# Patient Record
Sex: FEMALE | Race: WHITE | Employment: FULL TIME | URBAN - METROPOLITAN AREA
[De-identification: names, ages, dates, MRNs, and addresses within clinical notes are randomized per-mention and may not be internally consistent; named-entity substitution may affect disease eponyms.]

---

## 2017-10-13 ENCOUNTER — OFFICE VISIT (OUTPATIENT)
Dept: ORTHOPEDIC SURGERY | Age: 22
End: 2017-10-13

## 2017-10-13 VITALS — HEIGHT: 63 IN | WEIGHT: 220 LBS | BODY MASS INDEX: 38.98 KG/M2

## 2017-10-13 DIAGNOSIS — S63.681A OTHER SPRAIN OF RIGHT THUMB, INITIAL ENCOUNTER: ICD-10-CM

## 2017-10-13 DIAGNOSIS — M79.644 FINGER PAIN, RIGHT: Primary | ICD-10-CM

## 2017-10-13 PROCEDURE — L3809 WHFO W/O JOINTS PRE OTS: HCPCS | Performed by: PHYSICIAN ASSISTANT

## 2017-10-13 PROCEDURE — 73140 X-RAY EXAM OF FINGER(S): CPT | Performed by: PHYSICIAN ASSISTANT

## 2017-10-13 PROCEDURE — 99214 OFFICE O/P EST MOD 30 MIN: CPT | Performed by: PHYSICIAN ASSISTANT

## 2017-10-13 RX ORDER — ESCITALOPRAM OXALATE 10 MG/1
TABLET ORAL
Refills: 3 | COMMUNITY
Start: 2017-10-02

## 2017-10-13 RX ORDER — SODIUM FLUORIDE 6 MG/ML
PASTE, DENTIFRICE DENTAL
Refills: 3 | COMMUNITY
Start: 2017-10-05

## 2017-10-13 NOTE — PROGRESS NOTES
require stabilization / immobilization from this semi-rigid / rigid orthosis to improve their function. The orthosis will assist in protecting the affected area, provide functional support and facilitate healing. The patient was educated and fit by a healthcare professional with expert knowledge and specialization in brace application while under the direct supervision of the treating physician. Verbal and written instructions for the use of and application of this item were provided. They were instructed to contact the office immediately should the brace result in increased pain, decreased sensation, increased swelling or worsening of the condition. Treatment Plan:  Patient is placed into a thumb spica removable brace. She is encouraged to ice and elevate. Over-the-counter anti-inflammatory medications. I have arranged for her to have follow-up with hand surgeon in 2 weeks if not doing better.

## 2018-01-22 ENCOUNTER — OFFICE VISIT (OUTPATIENT)
Dept: ORTHOPEDIC SURGERY | Age: 23
End: 2018-01-22

## 2018-01-22 VITALS
HEART RATE: 96 BPM | BODY MASS INDEX: 45.08 KG/M2 | WEIGHT: 245 LBS | HEIGHT: 62 IN | SYSTOLIC BLOOD PRESSURE: 119 MMHG | DIASTOLIC BLOOD PRESSURE: 76 MMHG

## 2018-01-22 DIAGNOSIS — M79.644 FINGER PAIN, RIGHT: Primary | ICD-10-CM

## 2018-01-22 PROCEDURE — 99213 OFFICE O/P EST LOW 20 MIN: CPT | Performed by: ORTHOPAEDIC SURGERY

## 2018-01-22 PROCEDURE — 73140 X-RAY EXAM OF FINGER(S): CPT | Performed by: ORTHOPAEDIC SURGERY

## 2018-01-23 NOTE — PROGRESS NOTES
Chief Complaint  Injury (Right small finger)      History of Present Illness:  Carrie Prieto is a 23 y.o. female , right-hand-dominant, diane at [employer], presenting with history of right small finger injury  Date of injury: Approximately mid November 2017  Mechanism of injury: The patient states that she was on a geology field trip for class when she smashed a geode with a hammer and the geode shattered into multiple fragments. One of the fragments penetrated her right small finger near the dorsal PIP joint and she had a small wound in this area. The wound subsequently healed but she noticed persistent swelling and area of prominence near the dorsal PIP joint of right small finger. She states that it does fluctuate in size and is irritating to her when performing gripping motions. She denies any weakness, no numbness or tingling in her finger and no additional pain throughout the right small finger or hand. No fever or chills or other constitutional symptoms    Medical History  Patient's medications, allergies, past medical, surgical, social and family histories were reviewed and updated as appropriate. Review of Systems  Pertinent items are noted in HPI  Review of systems reviewed from Patient History Form dated on 1/22/18 and available in the patient's chart under the Media tab. Vital Signs  Vitals:    01/22/18 0908   BP: 119/76   Pulse: 96       General Exam:   Constitutional: Patient is adequately groomed with no evidence of malnutrition  Mental Status: The patient is oriented to time, place and person. The patient's mood and affect are appropriate. Lymphatic: The lymphatic examination bilaterally reveals all areas to be without enlargement or induration. Neurological: The patient has good coordination. There is no weakness or sensory deficit.     Right small Finger/hand Examination  Inspection:  Raised area near the central aspect of dorsal PIP joint right small

## 2018-01-26 ENCOUNTER — TELEPHONE (OUTPATIENT)
Dept: ORTHOPEDIC SURGERY | Age: 23
End: 2018-01-26

## 2018-01-31 NOTE — PROGRESS NOTES
[facility] / [facility] ([facility]) Teagan Caban, [address]    Acknowledgment of Informed Consent for Surgical or Medical Procedure and Sedation  I agree to allow doctor(s) [facility] and his/her associates or assistants, including residents and/or other qualified medical practitioner to perform the following medical treatment or procedure and to administer or direct the administration of sedation as necessary:  Procedure(s): EXPLORATION OF RIGHT SMALL FINGER WOUND WITH REMOVAL OF FOREIGN BODY RIGHT SMALL FINGER, TENDON REPAIR AS INDICATED PROCEDURES  My doctor has explained the following regarding the proposed procedure:   the explanation of the procedure   the benefits of the procedure   the potential problems that might occur during recuperation   the risks and side effects of the procedure which could include but are not limited to severe blood loss, infection, stroke or death   the benefits, risks and side effect of alternative procedures including the consequences of declining this procedure or any alternative procedures   the likelihood of achieving satisfactory results. I acknowledge no guarantee or assurance has been made to me regarding the results. I understand that during the course of this treatment/procedure, unforeseen conditions can occur which require an additional or different procedure. I agree to allow my physician or assistants to perform such extension of the original procedure as they may find necessary. I understand that sedation will often result in temporary impairment of memory and fine motor skills and that sedation can occasionally progress to a state of deep sedation or general anesthesia. I understand the risks of anesthesia for surgery include, but are not limited to, sore throat, hoarseness, injury to face, mouth, or teeth; nausea; headache; injury to blood vessels or nerves; death, brain damage, or paralysis.     I understand that if I have a

## 2018-02-01 RX ORDER — ALBUTEROL SULFATE 90 UG/1
2 AEROSOL, METERED RESPIRATORY (INHALATION) EVERY 6 HOURS PRN
COMMUNITY

## 2018-02-01 NOTE — PROGRESS NOTES
The following educational items and goals will be achieved upon completion of the patient's Pre-admission testing experience:             Identify the learner who is being assessed for education:  family                    Ability to Learn:  Exhibits ability to grasp concepts and respond to questions: High  Ready to Learn: Yes  calm   Preferred Method of Learning:  verbal  Barriers to Learning: Verbalizes interest  Special Considerations due to cultural, Islam, spiritual beliefs:  No  Language:  English  :  Svetlana Archuleta  [x] Appropriate evaluation / integration of data as delineated by ASPAN Standards of Perianesthesia Nursing Practice    Pain scale and pain management   [x]Patient verbalizes understanding of pain scale and pain management  [x]Pre-operative determination of patients anticipated Post-Operative pain goal:   4 of 10 on 10 point scale post op goal  [] Other     Medication(s) - Compliance with preop medication instructions  [x] Patient verbalizes understanding of preop medications (see [facility] Presurgical Instructions)    Instructions, Pre op                                                                                            [x] Patient verbalizes understanding of presurgical instructions as reviewed with phone interview nurse or in-person nurse review    Fall Risk Potential, Preoperatively                                                                                   [x]No preoperative risk identified  []Preop risk identified:                    []Sensory deficit        []Motor deficit        []Balance problem        []Home medication        []Uses assistive device                    []History of a Fall within the last 30 days    Goal(s) for fall prevention:  [x]Prevent fall or injury by requesting assistance with activities of daily living  [x]Patient / Significant other verbalizes understanding the need to call for

## 2018-02-01 NOTE — PRE-PROCEDURE INSTRUCTIONS
family touch your surgery site without cleaning their hands. 4. Mild nausea, headache, muscle aches, sore throat, or fatigue may occur after anesthesia. Should any of these symptoms become severe, or should you notice any signs of infection, you should call your surgeon. 5. Narcotic pain medications can cause significant constipation. You may want to add a stool softener to your postoperative medication schedule or speak to your surgeon on how best to manage this SIDE EFFECT. SPECIAL INSTRUCTIONS     Thank you for allowing us to care for you. We strive to exceed your expectations in the delivery of care and service provided to you and your family. If you need to contact us for any reason, please call us at [phone]    Instructions reviewed with patient 'S MOM- HERNÁN during preadmission testing phone interview. AND LMOR FOR PATIENT CELL PHONE   Surjit Hatch. 2/1/2018 .12:58 PM      ADDITIONAL EDUCATIONAL INFORMATION REVIEWED PER PHONE WITH YOU AND/OR YOUR FAMILY:  Yes Bring a urine sample on day of surgery  Yes Pain Goal-Taking Control of Your Pain  Yes FAQs about Surgical Site Infections  Yes Hibiclens® Bathing Instructions / or Other Antibacterial Soap  No Incentive Spirometer Education

## 2018-02-02 ENCOUNTER — HOSPITAL ENCOUNTER (OUTPATIENT)
Dept: PREADMISSION TESTING | Age: 23
Discharge: HOME | End: 2018-02-02
Attending: ORTHOPAEDIC SURGERY | Admitting: ORTHOPAEDIC SURGERY

## 2018-02-02 VITALS
SYSTOLIC BLOOD PRESSURE: 128 MMHG | TEMPERATURE: 97.6 F | RESPIRATION RATE: 18 BRPM | HEART RATE: 90 BPM | HEIGHT: 63 IN | OXYGEN SATURATION: 100 % | DIASTOLIC BLOOD PRESSURE: 82 MMHG | WEIGHT: 246 LBS | BODY MASS INDEX: 43.59 KG/M2

## 2018-02-02 DIAGNOSIS — S60.456A FOREIGN BODY OF RIGHT LITTLE FINGER: Primary | ICD-10-CM

## 2018-02-02 LAB — PREGNANCY, URINE: NEGATIVE

## 2018-02-02 RX ORDER — FENTANYL CITRATE 50 UG/ML
25 INJECTION, SOLUTION INTRAMUSCULAR; INTRAVENOUS EVERY 5 MIN PRN
Status: DISCONTINUED | OUTPATIENT
Start: 2018-02-02 | End: 2018-02-03 | Stop reason: HOSPADM

## 2018-02-02 RX ORDER — SODIUM CHLORIDE, SODIUM LACTATE, POTASSIUM CHLORIDE, CALCIUM CHLORIDE 600; 310; 30; 20 MG/100ML; MG/100ML; MG/100ML; MG/100ML
INJECTION, SOLUTION INTRAVENOUS CONTINUOUS
Status: DISCONTINUED | OUTPATIENT
Start: 2018-02-02 | End: 2018-02-03 | Stop reason: HOSPADM

## 2018-02-02 RX ORDER — HYDROCODONE BITARTRATE AND ACETAMINOPHEN 5; 325 MG/1; MG/1
1 TABLET ORAL EVERY 6 HOURS PRN
Qty: 6 TABLET | Refills: 0 | Status: SHIPPED | OUTPATIENT
Start: 2018-02-02 | End: 2018-02-05

## 2018-02-02 RX ORDER — LABETALOL HYDROCHLORIDE 5 MG/ML
5 INJECTION, SOLUTION INTRAVENOUS EVERY 10 MIN PRN
Status: DISCONTINUED | OUTPATIENT
Start: 2018-02-02 | End: 2018-02-03 | Stop reason: HOSPADM

## 2018-02-02 RX ORDER — ONDANSETRON 2 MG/ML
4 INJECTION INTRAMUSCULAR; INTRAVENOUS
Status: ACTIVE | OUTPATIENT
Start: 2018-02-02 | End: 2018-02-02

## 2018-02-02 RX ORDER — ACETAMINOPHEN 500 MG
1000 TABLET ORAL ONCE
Status: COMPLETED | OUTPATIENT
Start: 2018-02-02 | End: 2018-02-02

## 2018-02-02 RX ORDER — MEPERIDINE HYDROCHLORIDE 25 MG/ML
12.5 INJECTION INTRAMUSCULAR; INTRAVENOUS; SUBCUTANEOUS EVERY 5 MIN PRN
Status: DISCONTINUED | OUTPATIENT
Start: 2018-02-02 | End: 2018-02-03 | Stop reason: HOSPADM

## 2018-02-02 RX ADMIN — Medication 1000 MG: at 09:58

## 2018-02-02 RX ADMIN — SODIUM CHLORIDE, SODIUM LACTATE, POTASSIUM CHLORIDE, CALCIUM CHLORIDE: 600; 310; 30; 20 INJECTION, SOLUTION INTRAVENOUS at 06:00

## 2018-02-02 ASSESSMENT — PAIN SCALES - GENERAL: PAINLEVEL_OUTOF10: 0

## 2018-02-02 ASSESSMENT — PAIN - FUNCTIONAL ASSESSMENT: PAIN_FUNCTIONAL_ASSESSMENT: 0-10

## 2018-02-02 NOTE — ANESTHESIA POST-OP
Anesthesia Post-op Note    Patient: Zi Gillis  MRN: [id number]  YOB: 1995  Date of evaluation: 2/2/2018  Time:  10:24 AM     Procedure(s) Performed:     Last Vitals: /82   Pulse 90   Temp 97.6 °F (36.4 °C) (Oral)   Resp 18   Ht 5' 2.5\" (1.588 m)   Wt 246 lb (111.6 kg)   SpO2 100%   BMI 44.28 kg/m²     John Phase I: John Score: 10    John Phase II: John Score: 10    Anesthesia Post Evaluation    Final anesthesia type: MAC  Patient location during evaluation: PACU  Patient participation: complete - patient participated  Level of consciousness: awake and alert  Pain score: 0  Airway patency: patent  Nausea & Vomiting: no nausea and no vomiting  Complications: no  Cardiovascular status: blood pressure returned to baseline  Respiratory status: acceptable  Hydration status: euvolemic        Bren Man MD  10:24 AM

## 2018-02-02 NOTE — BRIEF OP NOTE
Brief Postoperative Note    Laura Llanos  YOB: 1995  [id number]    Pre-operative Diagnosis: 1. Symptomatic foreign body, right small finger    Post-operative Diagnosis: Same    Procedure: 1.  Removal of foreign body, right small finger subcutaneous, complicated    Anesthesia: MAC and Local    Surgeons/Assistants: Chelsea Blevins MD/Kelly DUBON     Estimated Blood Loss: less than 5ml    Complications: None immediate apparent    Specimens: Was Obtained: foreign body right small finger with surrounding local tissue    Findings: see fully dictated operative report    Electronically signed by Jasmine Flannery MD on 2/2/2018 at 8:36 AM

## 2018-02-02 NOTE — PROGRESS NOTES
Right thumb dressing clean dry and intact with no drainage or bleeding or swelling. Pt given sling to keep right hand elevated, com[plained of headache so Tylenol PO given before discharge.

## 2018-02-02 NOTE — ANESTHESIA PRE-OP
Medication Dose Route Frequency Provider Last Rate Last Dose    lactated ringers infusion   Intravenous Continuous Carmel Hernandez  mL/hr at 02/02/18 0600      ceFAZolin (ANCEF) 2 g in dextrose 3 % 50 mL IVPB (duplex)  2 g Intravenous Once Cody Pat MD           Allergies: Allergies   Allergen Reactions    Sulfa Antibiotics Rash       Problem List:  There is no problem list on file for this patient. Past Medical History:        Diagnosis Date    Anxiety and depression     Asthma     Obese     Optic nerve swelling     Sensitivity to medication        Past Surgical History:        Procedure Laterality Date    WISDOM TOOTH EXTRACTION         Social History:    Social History   Substance Use Topics    Smoking status: Never Smoker    Smokeless tobacco: Never Used    Alcohol use No                                Counseling given: Not Answered      Vital Signs (Current):   Vitals:    02/01/18 1236 02/02/18 0551 02/02/18 0553   BP:   127/75   Pulse:   115   Resp:   16   Temp:   98 °F (36.7 °C)   TempSrc:   Oral   SpO2:   99%   Weight: 246 lb (111.6 kg) 246 lb (111.6 kg)    Height: 5' 3\" (1.6 m) 5' 2.5\" (1.588 m)                                               BP Readings from Last 3 Encounters:   02/02/18 127/75   01/22/18 119/76   08/23/14 130/76       NPO Status: Time of last liquid consumption: 2330                        Time of last solid consumption: 2315                        Date of last liquid consumption: 02/01/18                        Date of last solid food consumption: 02/01/18    BMI:   Wt Readings from Last 3 Encounters:   02/02/18 246 lb (111.6 kg)   01/22/18 245 lb (111.1 kg)   10/13/17 220 lb (99.8 kg)     Body mass index is 44.28 kg/m².     Anesthesia Evaluation   no history of anesthetic complications:   Airway: Mallampati: II  TM distance: >3 FB   Neck ROM: full  Mouth opening: > = 3 FB Dental:      Comment: Good dentition  Upper and lower Braces    Pulmonary: (+) asthma:                           ROS comment: Denies COPD, Smoking  + Asthma (rare MDI use)   Cardiovascular:                   ROS comment: Denies CP, SOA with moderate exercise     Neuro/Psych:   (+) psychiatric history:            GI/Hepatic/Renal:        (-) GERD, liver disease and no renal disease       Endo/Other:        (-) hypothyroidism, no Type II DM               Abdominal:           Vascular:                                        Anesthesia Plan      general     ASA 2       Induction: intravenous. Anesthetic plan and risks discussed with patient.                       Maximo Soulier, MD   2/2/2018

## 2018-02-03 NOTE — OP NOTE
[facility] [address]                                 OPERATIVE REPORT    PATIENT NAME: Edmond Velazquez                       :        1995  MED REC NO:   [id number]                          ROOM:  ACCOUNT NO:   [de-identified]                          ADMIT DATE: 2018  PROVIDER:     Joni Leonard MD    DATE OF PROCEDURE:  2018    PREOPERATIVE DIAGNOSIS:  Right small finger symptomatic foreign body. POSTOPERATIVE DIAGNOSIS:  Right small finger symptomatic foreign body. OPERATIONS AND PROCEDURES PERFORMED:  Exploration of right small finger  wound with removal of foreign body, right small finger subcutaneous,  complicated. ANESTHESIA:  MAC plus local anesthesia. PRIMARY SURGEON:  Joni Leonard MD    ASSISTANT:  [facility] surgical assistant. BLOOD LOSS:  Approximately 5 mL. COMPLICATIONS:  None immediate apparent. SPECIMENS:  Foreign body with surrounding local tissue sent for culture as  well as pathology. BRIEF HISTORY:  The patient is a 53-year-old female with a history of wound  to the dorsal aspect of her right small finger, which she sustained several  months prior to presenting to the clinic after she broke a rock structure  with penetrating injury to her right small finger. She had subsequent  continued swelling intermittently with pain with range of motion of her  finger, presented to my clinic, where images obtained demonstrated what  appeared to be a retained foreign body in her right small finger near the  PIP joint dorsally. We discussed options for treatment and after thorough  discussion, the patient elected for surgical exploration of the wound with  removal of the symptomatic foreign body and possible tendon repair or  arthrotomy as indicated.   She understood the risks, benefits, alternative  of the procedure with risks included, but not limited to infection,  bleeding, damage There  was no evidence of obvious tendon injury or traumatic arthrotomy from the  foreign body. The scar and granulomatous tissue were carefully removed  from the surface of the tendon while preserving the tendon structure of the  extensor mechanism PIP joint. No further evidence of foreign body. The  wound was irrigated copiously using normal saline irrigation at this point. Again, images of the finger under fluoroscopy two views of the right small  finger demonstrated at this point, no evidence of further retained foreign  body or other structures with normal appearance of the right small finger. Therefore at this point, tourniquet was deflated and hemostasis was  achieved. We then performed closure of the wound with interrupted 5-0  nylon suture. The patient was then placed into a sterile dressing and  overwrap, was awoken and taken to PACU in stable condition. POSTOPERATIVE PLAN:  Gentle finger range of motion as allowed in the  bandage. No heavy gripping or lifting, elevation x3 days of the right hand  small finger. Follow up culture and pathology report.   Plan for followup  in approximately 7 to 10 days for wound check and possible suture removal.        Elmer Philippe MD    D: 02/02/2018 15:45:43       T: 02/02/2018 15:48:25     [email or web address]  Job#: [id number]     Doc#: [id number]    CC:

## 2018-02-08 LAB
ANAEROBIC CULTURE: NORMAL
GRAM STAIN RESULT: NORMAL
WOUND/ABSCESS: NORMAL

## 2018-02-12 ENCOUNTER — OFFICE VISIT (OUTPATIENT)
Dept: ORTHOPEDIC SURGERY | Age: 23
End: 2018-02-12

## 2018-02-12 VITALS — BODY MASS INDEX: 43.59 KG/M2 | HEIGHT: 63 IN | WEIGHT: 246.03 LBS

## 2018-02-12 DIAGNOSIS — S60.459A FOREIGN BODY IN SKIN OF FINGER, INITIAL ENCOUNTER: Primary | ICD-10-CM

## 2018-02-12 PROCEDURE — 99024 POSTOP FOLLOW-UP VISIT: CPT | Performed by: ORTHOPAEDIC SURGERY

## 2020-07-01 ENCOUNTER — NURSE ONLY (OUTPATIENT)
Dept: PRIMARY CARE CLINIC | Age: 25
End: 2020-07-01

## 2020-07-01 PROCEDURE — 99211 OFF/OP EST MAY X REQ PHY/QHP: CPT | Performed by: NURSE PRACTITIONER

## 2020-07-01 NOTE — PROGRESS NOTES
Ely Magdaleno received a viral test for COVID-19. They were educated on isolation and quarantine as appropriate. For any symptoms, they were directed to seek care from their PCP, given contact information to establish with a doctor, directed to an urgent care or the emergency room.

## 2020-07-05 LAB
SARS-COV-2: NOT DETECTED
SOURCE: NORMAL

## 2021-03-22 ENCOUNTER — OFFICE VISIT (OUTPATIENT)
Dept: ORTHOPEDIC SURGERY | Age: 26
End: 2021-03-22

## 2021-03-22 VITALS — BODY MASS INDEX: 46.07 KG/M2 | HEIGHT: 63 IN | WEIGHT: 260 LBS

## 2021-03-22 DIAGNOSIS — S86.912A KNEE STRAIN, LEFT, INITIAL ENCOUNTER: ICD-10-CM

## 2021-03-22 DIAGNOSIS — M25.562 LEFT KNEE PAIN, UNSPECIFIED CHRONICITY: Primary | ICD-10-CM

## 2021-03-22 PROCEDURE — 99202 OFFICE O/P NEW SF 15 MIN: CPT | Performed by: PHYSICIAN ASSISTANT

## 2021-03-22 NOTE — PROGRESS NOTES
Subjective:      Patient ID: Sam Carey is a 27 y.o. female. Chief Complaint   Patient presents with    Knee Pain     Left knee, fell directly on her patella        HPI:   She is here for an initial evaluation of a new problem. Left lateral knee pain. Injury 3/20/2021. She was at work, [employer]. She is a carry out order specialist.  She states she slipped on something and fell in the cross knee position injuring her left knee. Pain Scale 6/10 VAS. Location of pain primarily lateral knee. Pain is worse with weightbearing. Pain improves with elevation. Previous treatments have included ice. Review Of Systems:   A 14 point review of systems and history form completed by the patient has been reviewed. This scanned in the media tab of the patient's chart under today's date. As outlined in the HPI. Negative for fever or chills. Negative for poly-joint pain, swelling and stiffness. Negative for numbness or tingling. Past Medical History:   Diagnosis Date    Anxiety and depression     Asthma     Obese     Optic nerve swelling     Sensitivity to medication        History reviewed. No pertinent family history. Past Surgical History:   Procedure Laterality Date    FINGER SURGERY Right 02/02/2018    small finger-removal of foreign body    WISDOM TOOTH EXTRACTION         Social History     Occupational History    Not on file   Tobacco Use    Smoking status: Never Smoker    Smokeless tobacco: Never Used   Substance and Sexual Activity    Alcohol use: No    Drug use: No    Sexual activity: Never     Partners: Male       Current Outpatient Medications   Medication Sig Dispense Refill    NONFORMULARY BIRTH CONTROL PILL NIGHTLY      escitalopram (LEXAPRO) 10 MG tablet TK 1 T PO NIGHTLY  3    cetirizine (ZYRTEC) 5 MG tablet Take 5 mg by mouth daily.  ibuprofen (ADVIL;MOTRIN) 200 MG tablet Take 200 mg by mouth every 8 hours as needed.       albuterol sulfate  (90 Base) MCG/ACT inhaler Inhale 2 puffs into the lungs every 6 hours as needed for Wheezing      PREVIDENT 5000 BOOSTER PLUS 1.1 % PSTE   3    acetaminophen (TYLENOL) 325 MG tablet Take 650 mg by mouth every 6 hours as needed.  pseudoephedrine (SUDAFED) 120 MG CR tablet Take 120 mg by mouth as needed. No current facility-administered medications for this visit. Objective:     She is alert, oriented x 3, pleasant, well nourished, developed and in no   acute distress. Ht 5' 3\" (1.6 m)   Wt 260 lb (117.9 kg)   BMI 46.06 kg/m²      Examination of the left knee: Inspection of the skin ecchymosis over the anterior medial proximal calf and anterior knee. Inspection of the soft tissues mild swelling. The overall alignment of the knee is neutral.  Gait is antalgic. There is minimal intra-articular effusion. AROM:           PROM:  Extension-         0                   0  Flexion -           120                   125  There mild pain associated with ROM testing. Medial joint line mildly tender to palpation. Lateral joint line moderately tender to palpation. Pes anserine bursa is non-tender to palpation. Patellar tendon is non-tender to palpation. Quadriceps tendon is non-tender to palpation. Collateral ligaments is non- tender to palpation. Popliteal fossa none tender to palpation. Varus Stress testing negative for laxity. Valgus Stress testing negative for laxity. Lachman's test negative for  ACL laxity. Anterior Drawer test negative for ACL laxity. Posterior Drawer test negative for PCL laxity. Daniela's Test negative for meniscus tear. Patellar Compression testing positive for pain or crepitus. Extensor Mechanism is intact. Examination of the lower extremities are intact with sensation to light touch. Motor testing  5/5 in all major motor groups of the lower extremities. Negative Brooke's Sign. SLR negative.       Examination of the lower extremities shows intact perfusion to all extremities. No cyanosis. Digits are warm to touch, capillary refill is less than 2 seconds. There is no edema noted. Examination of the skin over both lower extremities reveals: The skin to be intact without lacerations or abrasions. No significant erythema. No significant rashes or skin lesions. X Rays: performed in the office today:   AP, PA Standing, Lateral and Sunrise views of left knee:  No acute fractures or dislocations noted. There is no patellar subluxation. Additional Tests reviewed: none  Additional Outside Records reviewed: none    Diagnosis:       ICD-10-CM    1. Left knee pain, unspecified chronicity  M25.562 XR KNEE LEFT (MIN 4 VIEWS)   2. Knee strain, left, initial encounter  P32.928E         Assessment and Plan:       Assessment:  Left knee strain. [facility] injury. At least 16 minutes was the total time spent on today's visit  including reviewing test results, obtaining or reviewing history, physical exam, time spent on documentation or ordering prescriptions, tests and procedures after the visit. Plan:  Medications-   OTC NSAIDS discussed. She  was advised that NSAID-type medications have two very important potential side effects: gastrointestinal irritation including hemorrhage and renal injuries. She was asked to take the medication with food and to stop if she experiences any GI upset. I asked her to call for vomiting, abdominal pain or black/bloody stools. She should have renal function testing per his medical provider periodically. The patient expresses understanding of these issues and questions were answered. PT- A home exercise program was instructed today including ROM exercises and strengthening exercises. The patient verbalized understanding of these exercises as well as the importance of the exercise program to promote return of normal function.  If pain intensifies or other problems arise you are to notify the office. Further Imaging- none. Procedures- none. Off work x1 week. Note provided today. Follow up- 1 week with Dr Philly Howell. Call or return to clinic if these symptoms worsen or fail to improve as anticipated.

## 2021-03-22 NOTE — LETTER
[address] [facility] After Hours [facility]  [address] [facility] [address]  Phone: [phone]  Fax: [place], [place]        March 22, 2021     Patient: Marian Vallejo   YOB: 1995   Date of Visit: 3/22/2021       To Whom It May Concern: It is my medical opinion that Marian Vallejo should remain out of work until 3/29/21. If you have any questions or concerns, please don't hesitate to call.     Sincerely,        HÉCTOR Carreno

## 2021-03-29 ENCOUNTER — OFFICE VISIT (OUTPATIENT)
Dept: ORTHOPEDIC SURGERY | Age: 26
End: 2021-03-29
Payer: COMMERCIAL

## 2021-03-29 VITALS — HEIGHT: 63 IN | WEIGHT: 260 LBS | BODY MASS INDEX: 46.07 KG/M2

## 2021-03-29 DIAGNOSIS — S83.92XA SPRAIN OF LEFT KNEE, UNSPECIFIED LIGAMENT, INITIAL ENCOUNTER: Primary | ICD-10-CM

## 2021-03-29 PROCEDURE — 99243 OFF/OP CNSLTJ NEW/EST LOW 30: CPT | Performed by: FAMILY MEDICINE

## 2021-03-29 PROCEDURE — L1812 KO ELASTIC W/JOINTS PRE OTS: HCPCS | Performed by: FAMILY MEDICINE

## 2021-03-29 RX ORDER — NAPROXEN 500 MG/1
500 TABLET ORAL 2 TIMES DAILY WITH MEALS
Qty: 60 TABLET | Refills: 0 | Status: SHIPPED | OUTPATIENT
Start: 2021-03-29

## 2021-03-29 NOTE — PROGRESS NOTES
Subjective:      Patient ID: Solomon Bledsoe is a 27 y.o. female. Chief Complaint   Patient presents with    Knee Pain     [address] [facility]      Initial consultation left knee pain status post fall at work 3/20/2021    HPI:   She is here for an initial evaluation of a new problem. Left lateral knee pain. Injury 3/20/2021. She was at work, [employer]. She is a carry out order specialist.  She states she slipped on something and fell in the cross knee position injuring her left knee. Pain Scale 6/10 VAS. Location of pain primarily lateral knee. Pain is worse with weightbearing. Pain improves with elevation. Previous treatments have included ice. Bar Dc is a very pleasant 82-year-old white female who works at [employer] and is a very nice patient of Dr. Gary Frost who is being seen today in follow-up from after-hours on 3/22/2021 for work-related injury to her left knee. She states that on 3/20/2020 when she was at work working carry out when she slipped on something wet on the floor and sustained a valgus twisting rotational injury to her left knee and as she fell she subsequently kicked her anterior medial left leg with her right foot. This did result left fairly sizable hematoma to the upper medial tibia portion on the left. There is no pop or crack time the injury though she did have immediate pain followed by swelling. She was actually treated with icing and did take some Motrin but due to persistence of pain and difficulty with bearing weight, she was seen in after-hours on 3/22/2021 and was diagnosed with a knee contusion and possible strain. She was not placed on type of bracing but was encouraged to take over-the-counter anti-inflammatories and continue to ice. She was also referred to us today for orthopedic and sports consultation and she presents today stating that her knee symptoms have improved about 50%.   She is not really requiring crutches at this time but does have some pseudobuckling. She has had some popping but denies active locking or catching. At rest her pain symptoms are a 2-3 out of 10 but with attempted positional changes weightbearing and pivoting her pain symptoms can still be a 6-7 out of 10. She has been taking subtherapeutic dosings of Motrin and has not utilize any type of bracing or started any therapy. She does not really recall a previous history of injury. 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 She is being seen today for orthopedic and sports consultation with review of her imaging. Review Of Systems:   A 14 point review of systems and history form completed by the patient has been reviewed. This scanned in the media tab of the patient's chart under today's date. As outlined in the HPI. Negative for fever or chills. Negative for poly-joint pain, swelling and stiffness. Negative for numbness or tingling. Past Medical History:   Diagnosis Date    Anxiety and depression     Asthma     Obese     Optic nerve swelling     Sensitivity to medication        No family history on file.     Past Surgical History:   Procedure Laterality Date    FINGER SURGERY Right 02/02/2018    small finger-removal of foreign body    WISDOM TOOTH EXTRACTION         Social History     Occupational History    Not on file   Tobacco Use    Smoking status: Never Smoker    Smokeless tobacco: Never Used   Substance and Sexual Activity    Alcohol use: No    Drug use: No    Sexual activity: Never     Partners: Male       Current Outpatient Medications   Medication Sig Dispense Refill    naproxen (NAPROSYN) 500 MG tablet Take 1 tablet by mouth 2 times daily (with meals) 60 tablet 0    NONFORMULARY BIRTH CONTROL PILL NIGHTLY      albuterol sulfate  (90 Base) MCG/ACT inhaler Inhale 2 puffs into the lungs every 6 hours as needed for Wheezing      escitalopram (LEXAPRO) 10 MG tablet TK 1 T PO NIGHTLY  3    PREVIDENT 5000 BOOSTER PLUS 1.1 % PSTE   3    cetirizine (ZYRTEC) 5 MG tablet Take 5 mg by mouth daily.  ibuprofen (ADVIL;MOTRIN) 200 MG tablet Take 200 mg by mouth every 8 hours as needed.  acetaminophen (TYLENOL) 325 MG tablet Take 650 mg by mouth every 6 hours as needed.  pseudoephedrine (SUDAFED) 120 MG CR tablet Take 120 mg by mouth as needed. No current facility-administered medications for this visit. Objective:     She is alert, oriented x 3, pleasant, well nourished, developed and in no   acute distress. Ht 5' 3\" (1.6 m)   Wt 260 lb (117.9 kg)   BMI 46.06 kg/m²      Examination of the left knee: Inspection of the skin ecchymosis over the anterior medial proximal calf and anterior knee. This is quite substantial over the proximal medial tibia and measures about 10 x 12 cm. There is no tense knee joint effusion or high-grade deformity. Inspection of the soft tissues mild swelling. The overall alignment of the knee is neutral.  Gait is antalgic. There is minimal intra-articular effusion. AROM:           PROM:  Extension-         0                   0  Flexion -           120                   125  There mild pain associated with ROM testing. This is more prominent medially. Medial joint line mildly to moderately tender to palpation. She is also tender over the MCL and posterior medial joint line. Lateral joint line only mildly tender to palpation. Pes anserine bursa is non-tender to palpation. Patellar tendon is non-tender to palpation. Quadriceps tendon is non-tender to palpation. Collateral ligaments is non- tender to palpation laterally but she does appear to have some distal MCL tenderness. .  Popliteal fossa none tender to palpation. Varus Stress testing negative for laxity. Valgus Stress testing negative for laxity but does cause some mild pain at 2-3 out of 10. .  Lachman's test negative for  ACL laxity.   Anterior Drawer test unspecified ligament, initial encounter  S83. 92XA [facility] PT - [facility]     Breg Economy Hinged Knee WrapAround Brace     MRI KNEE LEFT WO CONTRAST        Assessment and Plan:       Assessment:  #1.  9 days status post left knee contusion with possible MCL sprain and reactive patellofemoral compression syndrome with possible internal derangement and synovitis    At least 30-40 minutes was the total time spent on today's visit  including reviewing test results, obtaining or reviewing history, physical exam, time spent on documentation or ordering prescriptions, tests and procedures after the visit. Plan:  Treatment options were discussed with Fidelia today. We did review her plain films and exam findings. She is now 9 days out from her injury which involve slipping twisting and falling onto her left knee at work. Her symptoms have improved about 50% but she does have considerable joint line tenderness and I would like for her to have an MRI of her knee to rule out an occult medial meniscus tear and/or MCL injury. She was placed in a wraparound knee brace. We did fill out a C9 requesting an MRI and we will start her in physical therapy. We also placed her on Naprosyn 500 mg 1 pill twice daily. We did fill out a [employer] 14 stating that she is not to lift carry push or pull more than 10 pounds and that I think it would be very agreeable for her to work as a seated checkout specialist at [employer] pending seeing her back post imaging. I think she can start therapy at this time. Icing and activity modification and importance of take her Naprosyn regularly was discussed. We will see her back post imaging. They will contact us in the interim with questions or concerns.

## 2021-04-06 ENCOUNTER — TELEPHONE (OUTPATIENT)
Dept: ORTHOPEDIC SURGERY | Age: 26
End: 2021-04-06

## 2021-04-06 NOTE — TELEPHONE ENCOUNTER
SPOKE TO PATIENT TO LET THEM KNOW THAT A PT EVALUATION HAS BEEN AUTHORIZED AND THAT SHE CAN CALL AND SCHEDULE THAT APPOINTMENT.

## 2021-04-07 ENCOUNTER — TELEPHONE (OUTPATIENT)
Dept: ORTHOPEDIC SURGERY | Age: 26
End: 2021-04-07

## 2021-04-07 NOTE — TELEPHONE ENCOUNTER
Spoke to patient and informed them that their MRI has been authorized and that they can call and schedule scan at their convenience. Also told them that they can call and schedule a f/u with Dr. Annie Fox once they have MRI scheduled, leaving at least 2-3 days for our office to receive their results.

## 2021-04-13 ENCOUNTER — HOSPITAL ENCOUNTER (OUTPATIENT)
Dept: PHYSICAL THERAPY | Age: 26
Setting detail: THERAPIES SERIES
Discharge: HOME | End: 2021-04-13
Payer: COMMERCIAL

## 2021-04-13 PROCEDURE — 97161 PT EVAL LOW COMPLEX 20 MIN: CPT | Performed by: PHYSICAL THERAPIST

## 2021-04-13 PROCEDURE — 97110 THERAPEUTIC EXERCISES: CPT | Performed by: PHYSICAL THERAPIST

## 2021-04-13 NOTE — PLAN OF CARE
The [facility] and [facility]  [address], [place], [address] [facility]  Phone: [phone]   Fax:     [phone]                                                       Physical Therapy Certification    Dear Referring Practitioner: Dr. Florida Beckford,    We had the pleasure of evaluating the following patient for physical therapy services at [address] [place] [address]. A summary of our findings can be found in the initial assessment below. This includes our plan of care. If you have any questions or concerns regarding these findings, please do not hesitate to contact me at the office phone number checked above. Thank you for the referral.       Physician Signature:_______________________________Date:__________________  By signing above (or electronic signature), therapists plan is approved by physician      Patient: Cesar Lawton   : 1995   MRN: [id number]  Referring Physician: Referring Practitioner: Dr. Florida Beckford      Evaluation Date: 2021      Medical Diagnosis Information:  Diagnosis: Sprain of unspecified site of left knee, initial encounter: S83. 92XA   Treatment Diagnosis: Sprain of unspecified site of left knee, initial encounter: S83. 92XA                                         Insurance information: PT Insurance Information: [id number]     Precautions/ Contra-indications: N/A  Latex Allergy:  [x]NO      []YES  Preferred Language for Healthcare:   [x]English       []other:    C-SSRS Triggered by Intake questionnaire (Past 2 wk assessment):   [x] No, Questionnaire did not trigger screening.   [] Yes, Patient intake triggered further evaluation      [] C-SSRS Screening completed  [] PCP notified via Plan of Care  [] Emergency services notified    SUBJECTIVE: Patient stated complaint: Pt complains of medial knee pain following falling on 3/20/21 with a valgus motion at the L knee.  Pain has decreased appropriately since, however is irritated with prolonged weight bearing. Unremarkable XR with MRI to follow. Has been wearing a brace, however only when expecting prolonged weight bearing. Relevant Medical History: N/A  Functional Disability Index:LEFS: 11%      Pain Scale: 1/10  Easing factors: rest  Provocative factors: prolonged weight bearing, deep flexion     Type: []Constant   [x]Intermittent  []Radiating [x]Localized []other:     Numbness/Tingling: N/A    Functional Limitations/Impairments: []Sitting [x]Standing [x]Walking    [x]Squatting/bending  [x]Stairs           []ADL's  []Transfers []Sports/Recreation []Other:    Occupation/School:  at [employer], working light duty: mostly seated as of now     Living Status/Prior Level of Function: Independent with ADLs and IADLs,     OBJECTIVE:     ROM LEFT RIGHT   HIP Flex WNL WNL   HIP Abd     HIP Ext     HIP IR     HIP ER     Knee ext WNL WNL   Knee Flex WNL WNL   Ankle PF WNL WNL   Ankle DF     Ankle In     Ankle Ev     Strength  LEFT RIGHT   HIP Flexors 4 4   HIP Abductors 4- 4-   HIP Ext     Hip ER 4- 4-   Knee EXT (quad) 4 4   Knee Flex (HS) 4 4   Ankle DF 5 5   Ankle PF 5 5   Ankle Inv     Ankle EV          Circumference  Mid  7 cm       LE Dermatomes     LE myotomes         Joint mobility:    [x]Normal    []Hypo   []Hyper    Palpation: TTP over medial joint line and distal MCL    Functional Mobility/Transfers: WNL    Posture: WNL    Bandages/Dressings/Incisions: N/A    Gait: (include devices/WB status) slightly decreased time spent on LLE    Orthopedic Special Tests: + marina, valgus stress  Negative varus stress test, anterior draw                        [x] Patient history, allergies, meds reviewed. Medical chart reviewed. See intake form. Review Of Systems (ROS):  [x]Performed Review of systems (Integumentary, CardioPulmonary, Neurological) by intake and observation. Intake form has been scanned into medical record.  Patient has been instructed to contact their primary care physician regarding ROS issues if not already being addressed at this time. Co-morbidities/Complexities (which will affect course of rehabilitation):   []None           Arthritic conditions   []Rheumatoid arthritis (M05.9)  []Osteoarthritis (M19.91)   Cardiovascular conditions   []Hypertension (I10)  []Hyperlipidemia (E78.5)  []Angina pectoris (I20)  []Atherosclerosis (I70)   Musculoskeletal conditions   []Disc pathology   []Congenital spine pathologies   []Prior surgical intervention  []Osteoporosis (M81.8)  []Osteopenia (M85.8)   Endocrine conditions   []Hypothyroid (E03.9)  []Hyperthyroid Gastrointestinal conditions   []Constipation (C77.41)   Metabolic conditions   []Morbid obesity (E66.01)  []Diabetes type 1(E10.65) or 2 (E11.65)   []Neuropathy (G60.9)     Pulmonary conditions   []Asthma (J45)  []Coughing   []COPD (J44.9)   Psychological Disorders  [x]Anxiety (F41.9)  [x]Depression (F32.9)   []Other:   []Other:          Barriers to/and or personal factors that will affect rehab potential:              []Age  []Sex              []Motivation/Lack of Motivation                        []Co-Morbidities              []Cognitive Function, education/learning barriers              []Environmental, home barriers              [x]profession/work barriers  []past PT/medical experience  []other:  Justification: Pt working job requiring weight bearing position     PACEMAKER:  - Denied having a pacemaker that would contraindicate the use of electrical modalities. METAL IMPLANTS:  - Denied metal implants that would contraindicate the use of thermal modalities. CANCER HISTORY:  - Denied a history of cancer that would contraindicate thermal modalities. Falls Risk Assessment (30 days):   [x] Falls Risk assessed and no intervention required.   [] Falls Risk assessed and Patient requires intervention due to being higher risk   TUG score (>12s at risk):     [] Falls education provided, including     ASSESSMENT:   Functional Impairments:     []Noted lumbar/proximal hip/LE joint hypomobility   []Decreased LE functional ROM   [x]Decreased core/proximal hip strength and neuromuscular control   [x]Decreased LE functional strength   []Reduced balance/proprioceptive control   []other:      Functional Activity Limitations (from functional questionnaire and intake)   [x]Reduced ability to tolerate prolonged functional positions   []Reduced ability or difficulty with changes of positions or transfers between positions   []Reduced ability to maintain good posture and demonstrate good body mechanics with sitting, bending, and lifting   []Reduced ability to sleep   [] Reduced ability or tolerance with driving and/or computer work   [x]Reduced ability to perform lifting, carrying tasks   [x]Reduced ability to squat   []Reduced ability to forward bend   [x]Reduced ability to ambulate prolonged functional periods/distances/surfaces   [x]Reduced ability to ascend/descend stairs   []Reduced ability to run, hop, cut or jump   []other:    Participation Restrictions   []Reduced participation in self care activities   [x]Reduced participation in home management activities   [x]Reduced participation in work activities   []Reduced participation in social activities. []Reduced participation in sport/recreation activities. Classification :    []Signs/symptoms consistent with post-surgical status including decreased ROM, strength and function.    [x]Signs/symptoms consistent with joint sprain/strain   []Signs/symptoms consistent with patella-femoral syndrome   []Signs/symptoms consistent with knee OA/hip OA   []Signs/symptoms consistent with internal derangement of knee/Hip   []Signs/symptoms consistent with functional hip weakness/NMR control      []Signs/symptoms consistent with tendinitis/tendinosis    []signs/symptoms consistent with pathology which may benefit from Dry needling      []other: Prognosis/Rehab Potential:      []Excellent   [x]Good    []Fair   []Poor    Tolerance of evaluation/treatment:    []Excellent   [x]Good    []Fair   []Poor    Physical Therapy Evaluation Complexity Justification  [x] A history of present problem with:  [] no personal factors and/or comorbidities that impact the plan of care;  [x]1-2 personal factors and/or comorbidities that impact the plan of care  []3 personal factors and/or comorbidities that impact the plan of care  [x] An examination of body systems using standardized tests and measures addressing any of the following: body structures and functions (impairments), activity limitations, and/or participation restrictions;:  [] a total of 1-2 or more elements   [] a total of 3 or more elements   [x] a total of 4 or more elements   [x] A clinical presentation with:  [x] stable and/or uncomplicated characteristics   [] evolving clinical presentation with changing characteristics  [] unstable and unpredictable characteristics;   [x] Clinical decision making of [x] low, [] moderate, [] high complexity using standardized patient assessment instrument and/or measurable assessment of functional outcome. [x] EVAL (LOW) 47513 (typically 20 minutes face-to-face)  [] EVAL (MOD) 00257 (typically 30 minutes face-to-face)  [] EVAL (HIGH) 79801 (typically 45 minutes face-to-face)  [] RE-EVAL     PLAN  Frequency/Duration:  2 days per week for 4 Weeks:  Interventions:  1. Therapeutic exercise including: strength training, ROM/flexibility, NMR and proprioception for the proximal hip, core and Lower extremity  2. Manual therapy as indicated including Dry Needling/IASTM, STM, PROM, Gr I-IV mobilizations, spinal mobilization/manipulation. 3. Modalities as needed including: thermal agents, E-stim, US, iontophoresis as indicated. 4. Patient education on joint protection, activity modification, progression of HEP. HEP instruction: Can be found scanned in media file. (see scanned forms)    GOALS:  Patient stated goal: return to work pain free        Therapist goals for Patient:   Short Term Goals: To be achieved in: 2 weeks  1. Independent in HEP and progression per patient tolerance, in order to prevent re-injury. [] Progressing: [] Met: [] Not Met: [] Adjusted   2. Patient will have a decrease in pain to facilitate improvement in movement, function, and ADLs as indicated by Functional Deficits. [] Progressing: [] Met: [] Not Met: [] Adjusted    Long Term Goals: To be achieved in: 4 weeks  1. Disability index score of 5% or less for the LEFS to assist with reaching prior level of function. [] Progressing: [] Met: [] Not Met: [] Adjusted  2. Patient will demonstrate an increase in Strength to knee extension/flexion, hip abduction/flexion to 5/5 to allow for proper functional mobility as indicated by patients Functional Deficits. [] Progressing: [] Met: [] Not Met: [] Adjusted   3. Patient will return to prolonged ambulation and standing without increased symptoms or restriction. [] Progressing: [] Met: [] Not Met: [] Adjusted  4.  Pt will return to work pain free and without restriction     [] Progressing: [] Met: [] Not Met: [] Adjusted     Electronically signed by:  Marli Moise, SPT, Aileen Silva PT, ALYSSA

## 2021-04-13 NOTE — FLOWSHEET NOTE
The [address],[address], [place]      Physical Therapy Treatment Note/ Progress Report:           Date:  2021    Patient Name:  Anisa Barrientos    :  1995  MRN: [id number]  Restrictions/Precautions:    Medical/Treatment Diagnosis Information:  · Diagnosis: Sprain of unspecified site of left knee, initial encounter: S83. 92XA  · Treatment Diagnosis: Sprain of unspecified site of left knee, initial encounter: S83. 14BY  Insurance/Certification information:  PT Insurance Information: [facility]  Physician Information:  Referring Practitioner: Dr. Ayla Can  Has the plan of care been signed (Y/N):        []  Yes  [x]  No     Date of Patient follow up with Physician: Not scheduled, planning to return once MRI is completed      Is this a Progress Report:     []  Yes  [x]  No        If Yes:  Date Range for reporting period:  Beginning  Ending    Progress report will be due (10 Rx or 30 days whichever is less):        Recertification will be due (POC Duration  / 90 days whichever is less): 21         Visit # Insurance Allowable Auth Required   [address] [place] [address] []  Yes []  No        Functional Scale: LEFS: 11%   Date assessed:  21      Latex Allergy:  [x]NO      []YES  Preferred Language for Healthcare:   [x]English       []other:      Pain level:  1/10     SUBJECTIVE:  See eval    OBJECTIVE: See eval   Observation:    Test measurements:      RESTRICTIONS/PRECAUTIONS: N/A    Exercises/Interventions:       Exercises Reps Notes/CUES   SLR flexion x30 HEP   SLR abduction x30 HEP   Clamshells Red theraband x30 HEP   Tableside hamstring stretch 4x30\" HEP                                                          L medial knee STM 5'                                 Therapeutic Exercise and NMR EXR  [x] (91907) Provided verbal/tactile cueing for activities related to strengthening, flexibility, endurance, ROM for improvements in LE, proximal hip, and core control with self care, mobility, lifting, ambulation. [x] (26627) Provided verbal/tactile cueing for activities related to improving balance, coordination, kinesthetic sense, posture, motor skill, proprioception to assist with LE, proximal hip, and core control in self-care, mobility, lifting, ambulation and eccentric single leg control. NMR and Therapeutic Activities:    [x] (95723 or 54395) Provided verbal/tactile cueing for activities related to improving balance, coordination, kinesthetic sense, posture, motor skill, proprioception and motor activation to allow for proper function of core, proximal hip and LE with self-care and ADLs and functional mobility.   [] (47141) Gait Re-education- Provided training and instruction to the patient for proper LE, core and proximal hip recruitment and positioning and eccentric body weight control with ambulation re-education including up and down stairs     Home Exercise Program:    [x] (69274) Reviewed/Progressed HEP activities related to strengthening, flexibility, endurance, ROM of core, proximal hip and LE for functional self-care, mobility, lifting and ambulation/stair navigation   [] (63748) Reviewed/Progressed HEP activities related to improving balance, coordination, kinesthetic sense, posture, motor skill, proprioception of core, proximal hip and LE for self-care, mobility, lifting, and ambulation/stair navigation      Manual Treatments:  PROM / STM / Oscillations-Mobs:  G-I, II, III, IV (PA's, Inf., Post.)  [x] (29714) Provided manual therapy to mobilize LE, proximal hip and/or LS spine soft tissue/joints for the purpose of modulating pain, promoting relaxation, increasing ROM, reducing/eliminating soft tissue swelling/inflammation/restriction, improving soft tissue extensibility and allowing for proper ROM for normal function with self-care, mobility, lifting and ambulation. Modalities:    CP x10   [] GAME READY (VASO)- for significant edema, swelling, pain control. return to work pain free and without restriction     []? Progressing: []? Met: []? Not Met: []? Adjusted              Overall Progression Towards Functional goals/ Treatment Progress Update:  [] Patient is progressing as expected towards functional goals listed. [] Progression is slowed due to complexities/Impairments listed. [] Progression has been slowed due to co-morbidities. [x] Plan just implemented, too soon to assess goals progression <30days   [] Goals require adjustment due to lack of progress  [] Patient is not progressing as expected and requires additional follow up with physician  [] Other    Prognosis for POC: [x] Good [] Fair  [] Poor      Patient requires continued skilled intervention: [x] Yes  [] No    Treatment/Activity Tolerance:  [x] Patient able to complete treatment  [] Patient limited by fatigue  [] Patient limited by pain     [] Patient limited by other medical complications  [] Other:             PLAN: See eval  [] Continue per plan of care [] Alter current plan (see comments above)  [x] Plan of care initiated [] Hold pending MD visit [] Discharge      Electronically signed by:  YANI Rodriguez, Eric Zuleta PT, OMT-C    Note: If patient does not return for scheduled/ recommended follow up visits, this note will serve as a discharge from care along with most recent update on progress.

## 2021-04-20 ENCOUNTER — HOSPITAL ENCOUNTER (OUTPATIENT)
Dept: MRI IMAGING | Age: 26
Discharge: HOME | End: 2021-04-20
Payer: COMMERCIAL

## 2021-04-20 DIAGNOSIS — S83.92XA SPRAIN OF LEFT KNEE, UNSPECIFIED LIGAMENT, INITIAL ENCOUNTER: ICD-10-CM

## 2021-04-20 PROCEDURE — 73721 MRI JNT OF LWR EXTRE W/O DYE: CPT

## 2021-04-21 ENCOUNTER — HOSPITAL ENCOUNTER (OUTPATIENT)
Dept: PHYSICAL THERAPY | Age: 26
Setting detail: THERAPIES SERIES
End: 2021-04-21
Payer: COMMERCIAL

## 2021-04-26 ENCOUNTER — OFFICE VISIT (OUTPATIENT)
Dept: ORTHOPEDIC SURGERY | Age: 26
End: 2021-04-26
Payer: COMMERCIAL

## 2021-04-26 VITALS — HEIGHT: 63 IN | BODY MASS INDEX: 46.42 KG/M2 | WEIGHT: 262 LBS

## 2021-04-26 DIAGNOSIS — S83.92XA SPRAIN OF LEFT KNEE, UNSPECIFIED LIGAMENT, INITIAL ENCOUNTER: Primary | ICD-10-CM

## 2021-04-26 PROCEDURE — 99213 OFFICE O/P EST LOW 20 MIN: CPT | Performed by: FAMILY MEDICINE

## 2021-04-26 RX ORDER — NAPROXEN 500 MG/1
500 TABLET ORAL 2 TIMES DAILY WITH MEALS
Qty: 60 TABLET | Refills: 3 | Status: SHIPPED | OUTPATIENT
Start: 2021-04-26

## 2021-04-26 NOTE — PROGRESS NOTES
Subjective:      Patient ID: Jerman Carter is a 27 y.o. female. Chief Complaint   Patient presents with    Knee Pain     TR MRI LEFT KNEE     Follow-up left knee pain status post fall at work 3/20/2021. Review of left knee MRI    HPI:   She is here for an initial evaluation of a new problem. Left lateral knee pain. Injury 3/20/2021. She was at work, [employer]. She is a carry out order specialist.  She states she slipped on something and fell in the cross knee position injuring her left knee. Pain Scale 6/10 VAS. Location of pain primarily lateral knee. Pain is worse with weightbearing. Pain improves with elevation. Previous treatments have included ice. Dakotah Garcia is a very pleasant 56-year-old white female who works at [employer] and is a very nice patient of Dr. Vinayak Peres who is being seen today in follow-up from after-hours on 3/22/2021 for work-related injury to her left knee. She states that on 3/20/2020 when she was at work working carry out when she slipped on something wet on the floor and sustained a valgus twisting rotational injury to her left knee and as she fell she subsequently kicked her anterior medial left leg with her right foot. This did result left fairly sizable hematoma to the upper medial tibia portion on the left. There is no pop or crack time the injury though she did have immediate pain followed by swelling. She was actually treated with icing and did take some Motrin but due to persistence of pain and difficulty with bearing weight, she was seen in after-hours on 3/22/2021 and was diagnosed with a knee contusion and possible strain. She was not placed on type of bracing but was encouraged to take over-the-counter anti-inflammatories and continue to ice. She was also referred to us today for orthopedic and sports consultation and she presents today stating that her knee symptoms have improved about 50%.   She is not really requiring crutches at this time but does have some pseudobuckling. She has had some popping but denies active locking or catching. At rest her pain symptoms are a 2-3 out of 10 but with attempted positional changes weightbearing and pivoting her pain symptoms can still be a 6-7 out of 10. She has been taking subtherapeutic dosings of Motrin and has not utilize any type of bracing or started any therapy. She does not really recall a previous history of injury. 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 She is being seen today for orthopedic and sports consultation with review of her imaging. Jake Chambers was originally evaluated in the office on 3/29/2021 and is now almost 5 weeks out from her left knee injury. She presents back today to the office to review her MRI and states that her symptoms are doing much better. She is 98% improved overall and has gotten benefit from her knee brace. She has been able to return to work without restriction. She did see the physical therapy one time and has been reasonably compliant with her home-based exercises as tolerated her Naprosyn. She was finally approved and did have her MRI done on 4/20/2021 which did show signs consistent with a hyperextension injury without high-grade internal derangement. There was evidence of lateral patellofemoral tilting signs consistent with maltracking. There was some slight signal peripherally to the posterior horn lateral meniscus consistent with degeneration without willie tear. She may have had some myxoid degeneration of the medial meniscus once again no evidence of displaced tear. Clinically she is doing much better and has been able to work in the brace. No further swelling she does feel as if her motion and strength is improving is pleased with her progress. Review Of Systems:   A 14 point review of systems and history form completed by the patient has been reviewed.   This scanned in the media tab of the patient's chart under today's date. As outlined in the HPI. Negative for fever or chills. Negative for poly-joint pain, swelling and stiffness. Negative for numbness or tingling. Past Medical History:   Diagnosis Date    Anxiety and depression     Asthma     Obese     Optic nerve swelling     Sensitivity to medication        No family history on file. Past Surgical History:   Procedure Laterality Date    FINGER SURGERY Right 02/02/2018    small finger-removal of foreign body    WISDOM TOOTH EXTRACTION         Social History     Occupational History    Not on file   Tobacco Use    Smoking status: Never Smoker    Smokeless tobacco: Never Used   Substance and Sexual Activity    Alcohol use: No    Drug use: No    Sexual activity: Never     Partners: Male       Current Outpatient Medications   Medication Sig Dispense Refill    naproxen (NAPROSYN) 500 MG tablet Take 1 tablet by mouth 2 times daily (with meals) 60 tablet 0    NONFORMULARY BIRTH CONTROL PILL NIGHTLY      albuterol sulfate  (90 Base) MCG/ACT inhaler Inhale 2 puffs into the lungs every 6 hours as needed for Wheezing      escitalopram (LEXAPRO) 10 MG tablet TK 1 T PO NIGHTLY  3    PREVIDENT 5000 BOOSTER PLUS 1.1 % PSTE   3    cetirizine (ZYRTEC) 5 MG tablet Take 5 mg by mouth daily.  ibuprofen (ADVIL;MOTRIN) 200 MG tablet Take 200 mg by mouth every 8 hours as needed.  acetaminophen (TYLENOL) 325 MG tablet Take 650 mg by mouth every 6 hours as needed.  pseudoephedrine (SUDAFED) 120 MG CR tablet Take 120 mg by mouth as needed. No current facility-administered medications for this visit. Objective:     She is alert, oriented x 3, pleasant, well nourished, developed and in no   acute distress. Ht 5' 3\" (1.6 m)   Wt 262 lb (118.8 kg)   BMI 46.41 kg/m²      Examination of the left knee: Inspection of the skin reveals resolution of her previous ecchymosis over the anterior medial proximal calf and anterior knee. There is no tense knee joint effusion or high-grade deformity. Inspection of the soft tissues no longer reveals swelling  The overall alignment of the knee is neutral.  Gait is antalgic. There is minimal intra-articular effusion. AROM:           PROM:  Extension-         0                   0  Flexion -           130                 35  Full active and passive range of motion with some residual tightness to her hamstring. Medial joint line is no longer tender. She is also no longer tender over the MCL and posterior medial joint line. Tender over the MCL and posterior medial joint line. Lateral joint line now non tender to palpation. Pes anserine bursa is non-tender to palpation. Patellar tendon is non-tender to palpation. Quadriceps tendon is non-tender to palpation. Collateral ligaments is non- tender to palpation laterally when she no longer has tenderness over the distal MCL. Popliteal fossa none tender to palpation. Varus Stress testing negative for laxity. Valgus Stress testing negative for laxity and no longer results in pain. Lachman's test negative for  ACL laxity. Anterior Drawer test negative for ACL laxity. Posterior Drawer test negative for PCL laxity. Daniela's Test negative for meniscus tear. Patellar Compression testing is now effectively negative today. Extensor Mechanism is intact. Examination of the lower extremities are intact with sensation to light touch. Motor testing  5/5 in all major motor groups of the lower extremities. Negative Brooke's Sign. SLR negative. Examination of the lower extremities shows intact perfusion to all extremities. No cyanosis. Digits are warm to touch, capillary refill is less than 2 seconds. There is no edema noted. Examination of the skin over both lower extremities reveals: The skin to be intact without lacerations or abrasions. No significant erythema.   No significant rashes or skin lesions. Contralateral Exam: Examination of the right knee reveals intact skin. There is no focal tenderness. The patient demonstrates full painless range of motion with regards to flexion and extension. Strength is 5/5 thorough out all planes. Ligamentous stability is grossly intact. Right Lower Extremity: Examination of the right lower extremity does not show any tenderness, deformity or injury. Range of motion is unremarkable. There is no gross instability. There are no rashes, ulcerations or lesions. Strength and tone are normal.  Left Lower Extremity: Examination of the left lower extremity does not show any tenderness, deformity or injury. Range of motion is unremarkable. There is no gross instability. There are no rashes, ulcerations or lesions. Strength and tone are normal.    Left knee MRI obtained 4/20/2021 is listed above  Narrative   MRI OF THE  LEFT KNEE on April 20, 2021       HISTORY: Sprain of left knee.       PROCEDURE: Multiple axial sagittal and coronal sequences were obtained, with and without fat suppression.       FINDINGS:        The anterior and posterior cruciate ligaments remain intact. . No marrow edema is seen if affecting the femoral condyles or tibial plateau.       The knee is held in hyperextension       The medial meniscus is normal in appearance. The lateral meniscus is also normal in appearance       There is some peripheral signal noted in the posterior horn lateral meniscus and no definitive tear appreciated.       There is patella jorje and lateral patellar tilt and mild subluxation as noted on axial image 8 without underlying cartilage defect. There is some prepatellar mild edematous change along the patellar tendon.       No significant chondromalacia of the patella seen.  No joint effusion is noted.        The quadriceps and patellar tendons remain intact.        The collateral ligaments also remain intact.           Impression       1.  Patella jorje with discussed. The importance of continuing her strengthening and flexibility exercises were discussed with her. I think we can see her back as needed but she will contact us in the interim with questions or concerns.

## 2021-04-27 ENCOUNTER — HOSPITAL ENCOUNTER (OUTPATIENT)
Dept: PHYSICAL THERAPY | Age: 26
Setting detail: THERAPIES SERIES
End: 2021-04-27
Payer: COMMERCIAL

## 2021-06-01 ENCOUNTER — TELEPHONE (OUTPATIENT)
Dept: ORTHOPEDIC SURGERY | Age: 26
End: 2021-06-01

## 2021-06-01 NOTE — TELEPHONE ENCOUNTER
FAXED AN APPLICATION FOR WAGE LOSS COMPENSATION FOR [facility] THAT WAS SENT TO OUR DEPT THRU  TO [facility] @ [phone]
